# Patient Record
Sex: MALE | HISPANIC OR LATINO | Employment: FULL TIME | URBAN - METROPOLITAN AREA
[De-identification: names, ages, dates, MRNs, and addresses within clinical notes are randomized per-mention and may not be internally consistent; named-entity substitution may affect disease eponyms.]

---

## 2020-02-29 ENCOUNTER — HOSPITAL ENCOUNTER (OUTPATIENT)
Dept: LAB | Facility: MEDICAL CENTER | Age: 25
End: 2020-02-29
Attending: NURSE PRACTITIONER
Payer: COMMERCIAL

## 2020-02-29 LAB
ALBUMIN SERPL BCP-MCNC: 4.6 G/DL (ref 3.2–4.9)
ALBUMIN/GLOB SERPL: 1.6 G/DL
ALP SERPL-CCNC: 64 U/L (ref 30–99)
ALT SERPL-CCNC: 15 U/L (ref 2–50)
ANION GAP SERPL CALC-SCNC: 8 MMOL/L (ref 0–11.9)
AST SERPL-CCNC: 20 U/L (ref 12–45)
BASOPHILS # BLD AUTO: 0.4 % (ref 0–1.8)
BASOPHILS # BLD: 0.02 K/UL (ref 0–0.12)
BILIRUB CONJ SERPL-MCNC: 0.2 MG/DL (ref 0.1–0.5)
BILIRUB INDIRECT SERPL-MCNC: 0.9 MG/DL (ref 0–1)
BILIRUB SERPL-MCNC: 1.1 MG/DL (ref 0.1–1.5)
BUN SERPL-MCNC: 23 MG/DL (ref 8–22)
CALCIUM SERPL-MCNC: 9.4 MG/DL (ref 8.5–10.5)
CHLORIDE SERPL-SCNC: 103 MMOL/L (ref 96–112)
CHOLEST SERPL-MCNC: 126 MG/DL (ref 100–199)
CO2 SERPL-SCNC: 25 MMOL/L (ref 20–33)
CREAT SERPL-MCNC: 0.96 MG/DL (ref 0.5–1.4)
EOSINOPHIL # BLD AUTO: 0.03 K/UL (ref 0–0.51)
EOSINOPHIL NFR BLD: 0.7 % (ref 0–6.9)
ERYTHROCYTE [DISTWIDTH] IN BLOOD BY AUTOMATED COUNT: 44.3 FL (ref 35.9–50)
EST. AVERAGE GLUCOSE BLD GHB EST-MCNC: 91 MG/DL
GLOBULIN SER CALC-MCNC: 2.8 G/DL (ref 1.9–3.5)
GLUCOSE SERPL-MCNC: 79 MG/DL (ref 65–99)
HBA1C MFR BLD: 4.8 % (ref 0–5.6)
HCT VFR BLD AUTO: 43.8 % (ref 42–52)
HDLC SERPL-MCNC: 61 MG/DL
HGB BLD-MCNC: 15.6 G/DL (ref 14–18)
HIV 1+2 AB+HIV1 P24 AG SERPL QL IA: NON REACTIVE
IMM GRANULOCYTES # BLD AUTO: 0 K/UL (ref 0–0.11)
IMM GRANULOCYTES NFR BLD AUTO: 0 % (ref 0–0.9)
LDLC SERPL CALC-MCNC: 57 MG/DL
LYMPHOCYTES # BLD AUTO: 1.5 K/UL (ref 1–4.8)
LYMPHOCYTES NFR BLD: 33 % (ref 22–41)
MCH RBC QN AUTO: 32.8 PG (ref 27–33)
MCHC RBC AUTO-ENTMCNC: 35.6 G/DL (ref 33.7–35.3)
MCV RBC AUTO: 92.2 FL (ref 81.4–97.8)
MONOCYTES # BLD AUTO: 0.29 K/UL (ref 0–0.85)
MONOCYTES NFR BLD AUTO: 6.4 % (ref 0–13.4)
NEUTROPHILS # BLD AUTO: 2.71 K/UL (ref 1.82–7.42)
NEUTROPHILS NFR BLD: 59.5 % (ref 44–72)
NRBC # BLD AUTO: 0 K/UL
NRBC BLD-RTO: 0 /100 WBC
PLATELET # BLD AUTO: 257 K/UL (ref 164–446)
PMV BLD AUTO: 9.6 FL (ref 9–12.9)
POTASSIUM SERPL-SCNC: 4.2 MMOL/L (ref 3.6–5.5)
PROT SERPL-MCNC: 7.4 G/DL (ref 6–8.2)
RBC # BLD AUTO: 4.75 M/UL (ref 4.7–6.1)
SODIUM SERPL-SCNC: 136 MMOL/L (ref 135–145)
TREPONEMA PALLIDUM IGG+IGM AB [PRESENCE] IN SERUM OR PLASMA BY IMMUNOASSAY: NON REACTIVE
TRIGL SERPL-MCNC: 39 MG/DL (ref 0–149)
TSH SERPL DL<=0.005 MIU/L-ACNC: 0.62 UIU/ML (ref 0.38–5.33)
WBC # BLD AUTO: 4.6 K/UL (ref 4.8–10.8)

## 2020-02-29 PROCEDURE — 85025 COMPLETE CBC W/AUTO DIFF WBC: CPT

## 2020-02-29 PROCEDURE — 84443 ASSAY THYROID STIM HORMONE: CPT

## 2020-02-29 PROCEDURE — 80053 COMPREHEN METABOLIC PANEL: CPT

## 2020-02-29 PROCEDURE — 36415 COLL VENOUS BLD VENIPUNCTURE: CPT

## 2020-02-29 PROCEDURE — 83036 HEMOGLOBIN GLYCOSYLATED A1C: CPT

## 2020-02-29 PROCEDURE — 86780 TREPONEMA PALLIDUM: CPT

## 2020-02-29 PROCEDURE — 80061 LIPID PANEL: CPT

## 2020-02-29 PROCEDURE — 82248 BILIRUBIN DIRECT: CPT

## 2020-02-29 PROCEDURE — 87389 HIV-1 AG W/HIV-1&-2 AB AG IA: CPT

## 2022-04-29 ENCOUNTER — OFFICE VISIT (OUTPATIENT)
Dept: MEDICAL GROUP | Facility: MEDICAL CENTER | Age: 27
End: 2022-04-29
Payer: COMMERCIAL

## 2022-04-29 ENCOUNTER — HOSPITAL ENCOUNTER (OUTPATIENT)
Dept: LAB | Facility: MEDICAL CENTER | Age: 27
End: 2022-04-29
Attending: FAMILY MEDICINE
Payer: COMMERCIAL

## 2022-04-29 VITALS
TEMPERATURE: 97.9 F | HEART RATE: 90 BPM | WEIGHT: 173 LBS | BODY MASS INDEX: 22.2 KG/M2 | OXYGEN SATURATION: 99 % | DIASTOLIC BLOOD PRESSURE: 80 MMHG | SYSTOLIC BLOOD PRESSURE: 128 MMHG | HEIGHT: 74 IN

## 2022-04-29 DIAGNOSIS — Z13.79 GENETIC SCREENING: ICD-10-CM

## 2022-04-29 DIAGNOSIS — F32.1 MODERATE MAJOR DEPRESSION (HCC): ICD-10-CM

## 2022-04-29 DIAGNOSIS — S81.801A WOUND OF RIGHT LOWER EXTREMITY, INITIAL ENCOUNTER: ICD-10-CM

## 2022-04-29 DIAGNOSIS — F10.10 ALCOHOL ABUSE: ICD-10-CM

## 2022-04-29 DIAGNOSIS — Z23 NEED FOR VACCINATION: ICD-10-CM

## 2022-04-29 DIAGNOSIS — F41.1 GENERALIZED ANXIETY DISORDER: ICD-10-CM

## 2022-04-29 LAB
ALBUMIN SERPL BCP-MCNC: 4.4 G/DL (ref 3.2–4.9)
ALBUMIN/GLOB SERPL: 1.5 G/DL
ALP SERPL-CCNC: 62 U/L (ref 30–99)
ALT SERPL-CCNC: 17 U/L (ref 2–50)
ANION GAP SERPL CALC-SCNC: 11 MMOL/L (ref 7–16)
AST SERPL-CCNC: 20 U/L (ref 12–45)
BILIRUB SERPL-MCNC: 0.4 MG/DL (ref 0.1–1.5)
BUN SERPL-MCNC: 11 MG/DL (ref 8–22)
CALCIUM SERPL-MCNC: 9.4 MG/DL (ref 8.5–10.5)
CHLORIDE SERPL-SCNC: 103 MMOL/L (ref 96–112)
CHOLEST SERPL-MCNC: 99 MG/DL (ref 100–199)
CO2 SERPL-SCNC: 26 MMOL/L (ref 20–33)
CREAT SERPL-MCNC: 0.98 MG/DL (ref 0.5–1.4)
ERYTHROCYTE [DISTWIDTH] IN BLOOD BY AUTOMATED COUNT: 43.4 FL (ref 35.9–50)
FASTING STATUS PATIENT QL REPORTED: NORMAL
GFR SERPLBLD CREATININE-BSD FMLA CKD-EPI: 107 ML/MIN/1.73 M 2
GLOBULIN SER CALC-MCNC: 3 G/DL (ref 1.9–3.5)
GLUCOSE SERPL-MCNC: 87 MG/DL (ref 65–99)
HCT VFR BLD AUTO: 47.8 % (ref 42–52)
HDLC SERPL-MCNC: 42 MG/DL
HGB BLD-MCNC: 16.7 G/DL (ref 14–18)
LDLC SERPL CALC-MCNC: 46 MG/DL
MCH RBC QN AUTO: 32.8 PG (ref 27–33)
MCHC RBC AUTO-ENTMCNC: 34.9 G/DL (ref 33.7–35.3)
MCV RBC AUTO: 93.9 FL (ref 81.4–97.8)
PLATELET # BLD AUTO: 340 K/UL (ref 164–446)
PMV BLD AUTO: 9.5 FL (ref 9–12.9)
POTASSIUM SERPL-SCNC: 4.5 MMOL/L (ref 3.6–5.5)
PROT SERPL-MCNC: 7.4 G/DL (ref 6–8.2)
RBC # BLD AUTO: 5.09 M/UL (ref 4.7–6.1)
SODIUM SERPL-SCNC: 140 MMOL/L (ref 135–145)
TRIGL SERPL-MCNC: 54 MG/DL (ref 0–149)
TSH SERPL DL<=0.005 MIU/L-ACNC: 0.51 UIU/ML (ref 0.38–5.33)
WBC # BLD AUTO: 7.7 K/UL (ref 4.8–10.8)

## 2022-04-29 PROCEDURE — 90715 TDAP VACCINE 7 YRS/> IM: CPT | Performed by: FAMILY MEDICINE

## 2022-04-29 PROCEDURE — 90471 IMMUNIZATION ADMIN: CPT | Performed by: FAMILY MEDICINE

## 2022-04-29 PROCEDURE — 85027 COMPLETE CBC AUTOMATED: CPT

## 2022-04-29 PROCEDURE — 84443 ASSAY THYROID STIM HORMONE: CPT

## 2022-04-29 PROCEDURE — 80053 COMPREHEN METABOLIC PANEL: CPT

## 2022-04-29 PROCEDURE — 80061 LIPID PANEL: CPT

## 2022-04-29 PROCEDURE — 36415 COLL VENOUS BLD VENIPUNCTURE: CPT

## 2022-04-29 PROCEDURE — 84270 ASSAY OF SEX HORMONE GLOBUL: CPT

## 2022-04-29 PROCEDURE — 84403 ASSAY OF TOTAL TESTOSTERONE: CPT

## 2022-04-29 PROCEDURE — 99204 OFFICE O/P NEW MOD 45 MIN: CPT | Mod: 25 | Performed by: FAMILY MEDICINE

## 2022-04-29 PROCEDURE — 84402 ASSAY OF FREE TESTOSTERONE: CPT

## 2022-04-29 PROCEDURE — G0475 HIV COMBINATION ASSAY: HCPCS

## 2022-04-29 RX ORDER — FLUOXETINE 20 MG/1
20 TABLET, FILM COATED ORAL DAILY
Qty: 30 TABLET | Refills: 0 | Status: SHIPPED | OUTPATIENT
Start: 2022-04-29 | End: 2022-05-31

## 2022-04-29 RX ORDER — HYDROXYZINE HYDROCHLORIDE 25 MG/1
25 TABLET, FILM COATED ORAL 3 TIMES DAILY PRN
Qty: 90 TABLET | Refills: 0 | Status: SHIPPED | OUTPATIENT
Start: 2022-04-29

## 2022-04-29 RX ORDER — CEPHALEXIN 500 MG/1
500 CAPSULE ORAL 4 TIMES DAILY
Qty: 28 CAPSULE | Refills: 0 | Status: SHIPPED | OUTPATIENT
Start: 2022-04-29 | End: 2022-05-06

## 2022-04-29 RX ORDER — FLUOXETINE 10 MG/1
10 TABLET, FILM COATED ORAL DAILY
Qty: 7 TABLET | Refills: 0 | Status: SHIPPED | OUTPATIENT
Start: 2022-04-29 | End: 2022-05-31

## 2022-04-29 ASSESSMENT — ANXIETY QUESTIONNAIRES
1. FEELING NERVOUS, ANXIOUS, OR ON EDGE: MORE THAN HALF THE DAYS
IF YOU CHECKED OFF ANY PROBLEMS ON THIS QUESTIONNAIRE, HOW DIFFICULT HAVE THESE PROBLEMS MADE IT FOR YOU TO DO YOUR WORK, TAKE CARE OF THINGS AT HOME, OR GET ALONG WITH OTHER PEOPLE: SOMEWHAT DIFFICULT
GAD7 TOTAL SCORE: 10
2. NOT BEING ABLE TO STOP OR CONTROL WORRYING: MORE THAN HALF THE DAYS
3. WORRYING TOO MUCH ABOUT DIFFERENT THINGS: MORE THAN HALF THE DAYS
5. BEING SO RESTLESS THAT IT IS HARD TO SIT STILL: SEVERAL DAYS
6. BECOMING EASILY ANNOYED OR IRRITABLE: SEVERAL DAYS
4. TROUBLE RELAXING: MORE THAN HALF THE DAYS
7. FEELING AFRAID AS IF SOMETHING AWFUL MIGHT HAPPEN: NOT AT ALL

## 2022-04-29 ASSESSMENT — PATIENT HEALTH QUESTIONNAIRE - PHQ9
CLINICAL INTERPRETATION OF PHQ2 SCORE: 1
5. POOR APPETITE OR OVEREATING: 0 - NOT AT ALL
SUM OF ALL RESPONSES TO PHQ QUESTIONS 1-9: 7
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
## 2022-04-29 NOTE — ASSESSMENT & PLAN NOTE
We will do Tdap today given that he is due for 1 and has had contact with dirty metal  Will provide 7 days of Keflex to cover for skin infection

## 2022-04-29 NOTE — PATIENT INSTRUCTIONS
SLEEP HYGIENE CHECKLIST:    -Avoid naps.  Napping during the day can disturb the normal pattern of sleep and wakefulness.  -Avoid stimulants, such as caffeine and nicotine, and alcohol as bedtime approaches.  While alcohol is well known to speed the onset of sleep, the process of the body metabolizing the alcohol can cause arousal, thus disrupting sleep.  -Exercise.  All forms of exercise help to ensure sound sleep.  Vigorous activity should be conducted in the morning or late afternoon, while a relaxing exercise, like yoga, can be done before bed to help initiate a restful night sleep.  -Avoid foods too close to bedtime-particularly large meals and chocolate (which contains caffeine).  And try not to make any significant change to your diet.  For example, if you are struggling with sleep problem, is not a good time to start experimenting with spicy dishes.  -Soak up some natural light.  This is particularly important for older people who may not venture outside as frequently as children in younger adults.  Light exposure helps maintain a healthy sleep-wake cycle.  -Establish a regular bedtime routine.  Try to avoid emotionally upsetting conversations and activities before going to sleep.  -Associated bed with sleep.  Is not a good idea to watch television, use her computer or phone, listen to the radio, or read while in bed.  -Ensure a pleasant, relaxing sleep environment.  The bed should be comfortable, in the room should not be too hot, cold, or bright.    Check out www.sleepfoundation.org    Melatonin, unisom, valarian root are over the counter      [facility] [place]    What is alcohol - harm reduction strategy?      Harm reduction is a proactive approach to reducing the damage done by alcohol, drugs, and other addictive behaviors, as well as addressing broader health and social issues.  Six strategies to reduce alcohol consumption:     1. Drink plenty of  water  Before you sit down to drink, drink plenty of water to fill your stomach up. This will prevent you from being able to consume too much alcohol as it is liquid and your craving for it would somewhat get reduced due to the water already in your stomach. The water will also keep you hydrated and will repel the effects of alcohol which essentially dehydrates the body by making the kidneys produce more urine than usual and squeezing out essential fluids from all your organs. So if you drink plenty of water, you will stay hydrated and the effects of alcohol will take a lot of time to get a hold on your system.    2. Slow down  Do not gulp down your drink crazily. Take one sip at a time. You cannot quench your thirst with alcohol because alcohol dehydrates your body and continues to keep you thirsty, the reason why you feel like continuing to drink more and more. But gulping it down fast will only make the alcohol affect your system faster. Do not give alcohol the time and scope to control your body at a faster rate than you can drink it. By taking one sip at a time, you will allow it inside your body slowly and in very small quantities which will not be that detrimental to your health.    3. Don't mix drinks  A mistake which many drinkers do is that they try out different variations of drinks by mixing them up. The problem with mixing drinks is that they taste good and they actually get to you. You will only end up craving for alcohol more than usual. This is because different variants of alcohol enter the system in different speeds. While gin enters very fast, vodka takes more time.    4. Move around a lot  Do not sit at one place while you drink. If you are at a party or an event, move around a lot. Walk, meet and talk to people while holding a glass and concentrate more on the interactions instead of sitting at one place. This will help you keep alert of the burn effect and you will know when you begin to feel a  bit dizzy.     5. Say no to “bottoms up”  Never go for the game or bet of “bottoms up”. This is very unhealthy and though seems very sporty and fun, ultimately affects your health in a negative way. When you do a “bottoms up,” you essentially let in a large quantity of alcohol into your system all at once. This alcohol seeps into your bloodstream very fast and starts taking its effect immediately. It also gives you a kind of high which makes you drink more and more.    6. Order something to eat  Instead of only drinking, also order or get some food to eat. Try eating more than you drink. This will keep your stomach full and you will end up drinking less. Concentrate on eating more than you concentrate on drinking.    Reducing alcohol consumption is all about self-control and the will to actually not become an addict. If you have made a conscious decision to reduce your alcohol consumption, you will most certainly be able to do it.    http://www.[employer]/ways-to-reduce-alcohol-consumption/2/      Healthy Ways to [place] with Stress    We know that a lot of this post has focused on what can go wrong after a natural disaster. However, there are ways to cope with psychological stress that are much healthier than drugs or alcohol. It is important to remember the coping mechanisms that require little or no money and are relatively easy to do.    You can try meditating. Even just a few minutes of meditation might help boost your mood and calm you down. You don't need any special equipment or even a phone. You can just sit on your bed for 2-5 minutes and focus on relaxing the different places of tension in your body. You can try other breathing exercises like taking in a deep inhale and making a “s” sound as you exhale very slowly. Elongating your exhale will engage the parasympathetic nervous system and help calm you down. You might also try striking up a conversation with a stranger, focusing on something that feels  positive, or taking a short walk. All of these things have been shown to improve mood and functioning.    We know that it is hard to find healthy ways to cope when things get tough. There is arguably nothing more difficult to get through than the aftermath of a natural disaster. However, we hope that you are able to remain vigilant about the increased risk of substance abuse and hopefully find another way to get through the hard times.    If you feel that you need to completely stop drinking AA is still available via virtual platforms    Following COVID-19 non-essential closures and social distancing, many 12 step recovery groups have stopped meeting in physical locations. Online meeting rooms are available at [email or web address] and through [employer]. Local website includes a link as well [email or web address]

## 2022-04-29 NOTE — ASSESSMENT & PLAN NOTE
Discussed with patient that this is not helpful in the long-term and can negatively affect his anxiety and depression as well as sleep.  Provided him information after visit summary about stress and alcohol use.

## 2022-05-02 ENCOUNTER — TELEPHONE (OUTPATIENT)
Dept: MEDICAL GROUP | Facility: MEDICAL CENTER | Age: 27
End: 2022-05-02

## 2022-05-02 DIAGNOSIS — Z11.3 ROUTINE SCREENING FOR STI (SEXUALLY TRANSMITTED INFECTION): ICD-10-CM

## 2022-05-02 NOTE — PROGRESS NOTES
Patient called and spoke to MA requesting screening for HIV unfortunately do not have a lot of other details at this time but I have placed the order.

## 2022-05-02 NOTE — TELEPHONE ENCOUNTER
Pt called and would like to know if PCP have received the lab results he had done recently. Please advise.

## 2022-05-03 LAB
SHBG SERPL-SCNC: 20 NMOL/L (ref 17–56)
TESTOST FREE MFR SERPL: ABNORMAL % (ref 1.6–2.9)
TESTOST FREE SERPL-MCNC: ABNORMAL PG/ML (ref 47–244)
TESTOST SERPL-MCNC: >1500 NG/DL (ref 300–1080)

## 2022-05-04 LAB — HIV 1+2 AB+HIV1 P24 AG SERPL QL IA: NORMAL

## 2022-05-23 ENCOUNTER — APPOINTMENT (OUTPATIENT)
Dept: MEDICAL GROUP | Facility: MEDICAL CENTER | Age: 27
End: 2022-05-23

## 2022-05-31 RX ORDER — FLUOXETINE 20 MG/1
20 TABLET, FILM COATED ORAL DAILY
Qty: 30 TABLET | Refills: 0 | Status: SHIPPED | OUTPATIENT
Start: 2022-05-31

## 2025-07-08 ENCOUNTER — HOSPITAL ENCOUNTER (OUTPATIENT)
Facility: MEDICAL CENTER | Age: 30
End: 2025-07-08
Payer: MEDICAID

## 2025-07-08 ENCOUNTER — OFFICE VISIT (OUTPATIENT)
Dept: INTERNAL MEDICINE | Facility: OTHER | Age: 30
End: 2025-07-08
Payer: MEDICAID

## 2025-07-08 VITALS
SYSTOLIC BLOOD PRESSURE: 110 MMHG | TEMPERATURE: 97.3 F | BODY MASS INDEX: 23 KG/M2 | HEART RATE: 89 BPM | WEIGHT: 185 LBS | HEIGHT: 75 IN | OXYGEN SATURATION: 96 % | DIASTOLIC BLOOD PRESSURE: 67 MMHG

## 2025-07-08 DIAGNOSIS — Z11.3 SCREENING FOR STD (SEXUALLY TRANSMITTED DISEASE): ICD-10-CM

## 2025-07-08 DIAGNOSIS — Z76.89 ENCOUNTER TO ESTABLISH CARE: Primary | ICD-10-CM

## 2025-07-08 DIAGNOSIS — G47.00 INSOMNIA, UNSPECIFIED TYPE: ICD-10-CM

## 2025-07-08 DIAGNOSIS — F41.1 GENERALIZED ANXIETY DISORDER: ICD-10-CM

## 2025-07-08 DIAGNOSIS — F90.9 ATTENTION DEFICIT HYPERACTIVITY DISORDER (ADHD), UNSPECIFIED ADHD TYPE: ICD-10-CM

## 2025-07-08 PROBLEM — S81.801A LEG WOUND, RIGHT: Status: RESOLVED | Noted: 2022-04-29 | Resolved: 2025-07-08

## 2025-07-08 PROBLEM — F10.10 ALCOHOL ABUSE: Status: RESOLVED | Noted: 2022-04-29 | Resolved: 2025-07-08

## 2025-07-08 PROCEDURE — 3078F DIAST BP <80 MM HG: CPT | Mod: GC

## 2025-07-08 PROCEDURE — 80307 DRUG TEST PRSMV CHEM ANLYZR: CPT

## 2025-07-08 PROCEDURE — 3074F SYST BP LT 130 MM HG: CPT | Mod: GC

## 2025-07-08 PROCEDURE — 99204 OFFICE O/P NEW MOD 45 MIN: CPT | Mod: GC

## 2025-07-08 PROCEDURE — G0480 DRUG TEST DEF 1-7 CLASSES: HCPCS

## 2025-07-08 RX ORDER — ALPRAZOLAM 0.5 MG
0.5 TABLET ORAL NIGHTLY PRN
Qty: 30 TABLET | Refills: 0 | Status: CANCELLED | OUTPATIENT
Start: 2025-07-08 | End: 2025-08-07

## 2025-07-08 RX ORDER — DEXTROAMPHETAMINE SACCHARATE, AMPHETAMINE ASPARTATE, DEXTROAMPHETAMINE SULFATE AND AMPHETAMINE SULFATE 7.5; 7.5; 7.5; 7.5 MG/1; MG/1; MG/1; MG/1
30 TABLET ORAL DAILY
COMMUNITY
End: 2025-07-08

## 2025-07-08 RX ORDER — ALPRAZOLAM 0.5 MG
0.5 TABLET ORAL NIGHTLY PRN
COMMUNITY
End: 2025-07-08

## 2025-07-08 RX ORDER — DEXTROAMPHETAMINE SACCHARATE, AMPHETAMINE ASPARTATE MONOHYDRATE, DEXTROAMPHETAMINE SULFATE AND AMPHETAMINE SULFATE 2.5; 2.5; 2.5; 2.5 MG/1; MG/1; MG/1; MG/1
CAPSULE, EXTENDED RELEASE ORAL
Qty: 45 CAPSULE | Refills: 0 | Status: SHIPPED | OUTPATIENT
Start: 2025-07-08 | End: 2025-07-15

## 2025-07-08 RX ORDER — DOXEPIN HYDROCHLORIDE 10 MG/1
10 CAPSULE ORAL NIGHTLY
Qty: 30 CAPSULE | Refills: 0 | Status: SHIPPED | OUTPATIENT
Start: 2025-07-08

## 2025-07-08 ASSESSMENT — PATIENT HEALTH QUESTIONNAIRE - PHQ9
1. LITTLE INTEREST OR PLEASURE IN DOING THINGS: SEVERAL DAYS
5. POOR APPETITE OR OVEREATING: MORE THAN HALF THE DAYS
4. FEELING TIRED OR HAVING LITTLE ENERGY: NOT AT ALL
7. TROUBLE CONCENTRATING ON THINGS, SUCH AS READING THE NEWSPAPER OR WATCHING TELEVISION: NEARLY EVERY DAY
2. FEELING DOWN, DEPRESSED, IRRITABLE, OR HOPELESS: SEVERAL DAYS
SUM OF ALL RESPONSES TO PHQ9 QUESTIONS 1 AND 2: 2
9. THOUGHTS THAT YOU WOULD BE BETTER OFF DEAD, OR OF HURTING YOURSELF: NOT AT ALL
8. MOVING OR SPEAKING SO SLOWLY THAT OTHER PEOPLE COULD HAVE NOTICED. OR THE OPPOSITE, BEING SO FIGETY OR RESTLESS THAT YOU HAVE BEEN MOVING AROUND A LOT MORE THAN USUAL: MORE THAN HALF THE DAYS
3. TROUBLE FALLING OR STAYING ASLEEP OR SLEEPING TOO MUCH: MORE THAN HALF THE DAYS
6. FEELING BAD ABOUT YOURSELF - OR THAT YOU ARE A FAILURE OR HAVE LET YOURSELF OR YOUR FAMILY DOWN: SEVERAL DAYS
SUM OF ALL RESPONSES TO PHQ QUESTIONS 1-9: 12

## 2025-07-08 ASSESSMENT — ENCOUNTER SYMPTOMS
EYES NEGATIVE: 1
NERVOUS/ANXIOUS: 1
NEUROLOGICAL NEGATIVE: 1
CONSTITUTIONAL NEGATIVE: 1
CARDIOVASCULAR NEGATIVE: 1
RESPIRATORY NEGATIVE: 1
MUSCULOSKELETAL NEGATIVE: 1
GASTROINTESTINAL NEGATIVE: 1
INSOMNIA: 1
DEPRESSION: 1

## 2025-07-08 NOTE — PROGRESS NOTES
NEW PATIENT VISIT      Patient ID:  Name: Matheus Srivastava  YOB: 1995    Chief Complaint(s):      Establish Care with Primary Care Physician  Establish Care and Medication Refill      History of Present Illness:    This is a 29-year-old male with a past medical history of alcohol abuse, MDD, ELENA, insomnia.  Who presents to clinic to establish care with me as PCP. Previous established with Dr. Patterson at [facility].    The patient presented to the clinic to establish care after changing doctors. The primary concerns involved managing anxiety and ADHD. The patient reported past use of Prozac for anxiety and depression, attributing some anxiety to untreated ADHD. Adderall, including extended-release formulation, was noted to help with anxiety and focus issues. The patient expressed interest in discussing the re-initiation of Wellbutrin to address low sex drive, which the patient associated with previous medication regimens. The patient described a history of mood swings, with the current mood being unstable due to recent life changes, including starting a new business. The patient reported having been off medications since late May, following insurance and job changes. The patient was interested in restarting Adderall, with a plan to begin at a lower dose to minimize side effects and ramp up to his effective dose of 30 mg such as palpitations and anxiety. The patient expressed interest in cognitive behavioral therapy for mood management and to discuss insomnia symptoms.    He stated that his medications were from the past, but he would like to be reevaluated by psychiatrist and behavioral health team to further guide medication that may work for him.  Today he signed a controlled substance agreement with us, and conducted UDS in clinic.    PAST SURGICAL HISTORY:  None reported.    MEDICATIONS:  - Adderall    ALLERGIES:  No known allergies.    SOCIAL HISTORY:  - Smoking: Previously  "smoked one to two cigarettes per day, quit at age 26.  - Alcohol: Formerly drank heavily, now drinks socially.  - Drug use: No illicit drug use.  - Sexual history: Ni, uses protection, interested in PrEP.  - Occupation: Self-employed, starting a new business.    FAMILY HISTORY:  - Maternal aunt and grandmother with breast cancer.  - Paternal grandfather with prostate cancer.    Review of Systems   Constitutional: Negative.    HENT: Negative.     Eyes: Negative.    Respiratory: Negative.     Cardiovascular: Negative.    Gastrointestinal: Negative.    Genitourinary: Negative.    Musculoskeletal: Negative.    Skin: Negative.    Neurological: Negative.    Endo/Heme/Allergies: Negative.    Psychiatric/Behavioral:  Positive for depression. The patient is nervous/anxious and has insomnia.      Past Medical History[1]  Past Surgical History[2]  Family History   Problem Relation Age of Onset    Alcohol abuse Father     Breast Cancer Maternal Aunt     Alcohol abuse Maternal Uncle     Cancer Maternal Grandmother         Liver    Breast Cancer Maternal Grandmother     Alcohol abuse Maternal Grandfather     Cancer Paternal Grandfather         Prostate    Breast Cancer Maternal Aunt     Breast Cancer Maternal Aunt      Social History[3]  Current Medications[4]    Objective:    /67 (BP Location: Left arm, Patient Position: Sitting, BP Cuff Size: Adult)   Pulse 89   Temp 36.3 °C (97.3 °F) (Temporal)   Ht 1.905 m (6' 3\")   Wt 83.9 kg (185 lb)   SpO2 96%   BMI 23.12 kg/m² Body mass index is 23.12 kg/m².  Physical Exam  Constitutional:       Appearance: Normal appearance. He is normal weight.   HENT:      Head: Normocephalic.      Right Ear: External ear normal.      Left Ear: External ear normal.      Nose: Nose normal.      Mouth/Throat:      Mouth: Mucous membranes are moist.   Cardiovascular:      Rate and Rhythm: Normal rate and regular rhythm.      Pulses: Normal pulses.      Heart sounds: Normal heart sounds. "   Pulmonary:      Effort: Pulmonary effort is normal.      Breath sounds: Normal breath sounds.   Abdominal:      General: Abdomen is flat. Bowel sounds are normal.   Musculoskeletal:         General: Normal range of motion.   Skin:     General: Skin is warm.      Capillary Refill: Capillary refill takes less than 2 seconds.   Neurological:      General: No focal deficit present.      Mental Status: He is alert. Mental status is at baseline.   Psychiatric:         Thought Content: Thought content normal.         Judgment: Judgment normal.         Assessment and Plan:     1. Encounter to establish care (Primary)  Presented today to establish care with me as PCP.    2. Generalized anxiety disorder  3. Attention deficit hyperactivity disorder (ADHD), unspecified ADHD type  4. Insomnia, unspecified type  The patient experienced improvement in anxiety and focus with Adderall. The plan was to restart at a lower dose to manage side effects. showed interest in cognitive behavioral therapy, with past medications like Prozac and Wellbutrin noted for their effects on mood and sex drive. Considering the patient's use of Xanax for sleep, a discussion about starting Doxepin for chronic insomnia was suggested to avoid long-term benzodiazepine use.  - Controlled Substance Treatment Agreement  - URINE DRUG SCREEN; Future  - amphetamine-dextroamphetamine XR (ADDERALL XR) 10 MG CAPSULE SR 24 HR; Take 1 Capsule by mouth every morning for 15 days, THEN 2 Capsules every morning for 15 days.  Dispense: 45 Capsule; Refill: 0  - Referral to Behavioral Health  - doxepin (SINEQUAN) 10 MG Cap; Take 1 Capsule by mouth every evening.  Dispense: 30 Capsule; Refill: 0    5. Screening for STD (sexually transmitted disease)  - HEP C VIRUS ANTIBODY; Future    Problem List Items Addressed This Visit       Attention deficit hyperactivity disorder (ADHD)    Relevant Medications    amphetamine-dextroamphetamine XR (ADDERALL XR) 10 MG CAPSULE SR 24 HR     Other Relevant Orders    Controlled Substance Treatment Agreement    URINE DRUG SCREEN    Referral to Behavioral Health    Generalized anxiety disorder    Relevant Medications    doxepin (SINEQUAN) 10 MG Cap    Other Relevant Orders    Controlled Substance Treatment Agreement    URINE DRUG SCREEN    Referral to Behavioral Health     Other Visit Diagnoses         Encounter to establish care    -  Primary      Insomnia, unspecified type        Relevant Medications    doxepin (SINEQUAN) 10 MG Cap      Screening for STD (sexually transmitted disease)        Relevant Orders    HEP C VIRUS ANTIBODY            Orders Placed This Encounter    URINE DRUG SCREEN    HEP C VIRUS ANTIBODY    Referral to Behavioral Health    DISCONTD: amphetamine-dextroamphetamine (ADDERALL, 30MG,) 30 MG tablet    DISCONTD: ALPRAZolam (XANAX) 0.5 MG Tab    amphetamine-dextroamphetamine XR (ADDERALL XR) 10 MG CAPSULE SR 24 HR    doxepin (SINEQUAN) 10 MG Cap    Controlled Substance Treatment Agreement       No follow-ups on file.        Fany Garay M.D.  [facility] Resident       [1]   Past Medical History:  Diagnosis Date    Anxiety     Leg wound, right 04/29/2022    On Saturday patient was camping and jumped over middle phalanx and scraped his right leg.  He said the swelling and discharge has gone down.  But is  with some erythema and scabbing.     [2] No past surgical history on file.  [3]   Social History  Tobacco Use    Smoking status: Former     Types: Cigarettes    Smokeless tobacco: Never   Vaping Use    Vaping status: Never Used   Substance Use Topics    Alcohol use: Yes    Drug use: No   [4]   Current Outpatient Medications   Medication Sig Dispense Refill    amphetamine-dextroamphetamine XR (ADDERALL XR) 10 MG CAPSULE SR 24 HR Take 1 Capsule by mouth every morning for 15 days, THEN 2 Capsules every morning for 15 days. 45 Capsule 0    doxepin (SINEQUAN) 10 MG Cap Take 1 Capsule by mouth every evening. 30  Capsule 0     No current facility-administered medications for this visit.

## 2025-07-09 ENCOUNTER — TELEPHONE (OUTPATIENT)
Dept: INTERNAL MEDICINE | Facility: OTHER | Age: 30
End: 2025-07-09
Payer: MEDICAID

## 2025-07-09 NOTE — TELEPHONE ENCOUNTER
Received VM from patient.     He is requesting the Adderall and Doxepin scripts to be transferred to the [facility] in [place].  [facility] [id number] [facility] - [place], [place] - [address] [[id number]]     He also mentioned that he thought they agreed upon the instant release, instead of extended release?  Pt wants to clarify.     His insurance is not accepted at [facility].     Thank you!

## 2025-07-10 LAB
AMPHET CTO UR CFM-MCNC: ABNORMAL NG/ML
BARBITURATES CTO UR CFM-MCNC: NEGATIVE NG/ML
BENZODIAZ CTO UR CFM-MCNC: NEGATIVE NG/ML
CANNABINOIDS CTO UR CFM-MCNC: ABNORMAL NG/ML
COCAINE CTO UR CFM-MCNC: ABNORMAL NG/ML
CREAT UR-MCNC: 82.7 MG/DL (ref 20–400)
DRUG COMMENT 753798: ABNORMAL
METHADONE CTO UR CFM-MCNC: NEGATIVE NG/ML
OPIATES CTO UR CFM-MCNC: NEGATIVE NG/ML
PCP CTO UR CFM-MCNC: NEGATIVE NG/ML
PROPOXYPH CTO UR CFM-MCNC: NEGATIVE NG/ML

## 2025-07-10 NOTE — TELEPHONE ENCOUNTER
Phone Number Called: [phone]    Call outcome: Spoke to patient regarding message below.    Message: informed pt that I received his message and sent it to Dr. Garay.

## 2025-07-11 NOTE — Clinical Note
REFERRAL APPROVAL NOTICE         Sent on July 11, 2025                   Matheus Prasad  [address]  [place] [address]                   Dear Mr. Mary Prasad,    After a careful review of the medical information and benefit coverage, [facility] has processed your referral. See below for additional details.    If applicable, you must be actively enrolled with your insurance for coverage of the authorized service. If you have any questions regarding your coverage, please contact your insurance directly.    REFERRAL INFORMATION   Referral #:  [id number]  Referred-To Department    Referred-By Provider:  Behavioral Health    Fany Garay M.D.   Behavioral Health Outpatient      [address] [address]  [phone] [address]  [facility] [id number] [phone]    Referral Start Date:  07/08/2025  Referral End Date:   07/08/2026             SCHEDULING  If you do not already have an appointment, please call [phone] to make an appointment.     MORE INFORMATION  If you do not already have a [employer] account, sign up at: [email or web address]  You can access your medical information, make appointments, see lab results, billing information, and more.  If you have questions regarding this referral, please contact  the [facility] Referrals department at:             [phone]. Monday - Friday 8:00AM - 5:00PM.     Sincerely,    [facility]

## 2025-07-12 LAB
AMPHET UR CFM-MCNC: 1298 NG/ML
MDA UR CFM-MCNC: <200 NG/ML
MDEA UR CFM-MCNC: <200 NG/ML
MDMA UR CFM-MCNC: <200 NG/ML
METHAMPHET UR CFM-MCNC: <200 NG/ML
PHENTERMINE UR CFM-MCNC: <200 NG/ML
THC UR CFM-MCNC: 305 NG/ML

## 2025-07-13 LAB — BZE UR-MCNC: 904 NG/ML
